# Patient Record
Sex: MALE | Race: WHITE | NOT HISPANIC OR LATINO | Employment: FULL TIME | URBAN - METROPOLITAN AREA
[De-identification: names, ages, dates, MRNs, and addresses within clinical notes are randomized per-mention and may not be internally consistent; named-entity substitution may affect disease eponyms.]

---

## 2018-05-13 PROBLEM — I10 ESSENTIAL HYPERTENSION: Status: ACTIVE | Noted: 2018-05-13

## 2018-05-13 PROBLEM — E86.1 INTRAVASCULAR VOLUME DEPLETION: Status: ACTIVE | Noted: 2018-05-13

## 2018-05-13 PROBLEM — E11.9 DIABETES MELLITUS, TYPE 2: Status: ACTIVE | Noted: 2018-05-13

## 2018-05-13 PROBLEM — R79.89 ELEVATED TROPONIN: Status: ACTIVE | Noted: 2018-05-13

## 2018-05-13 PROBLEM — K92.2 ACUTE GI BLEEDING: Status: ACTIVE | Noted: 2018-05-13

## 2018-05-13 PROBLEM — R53.1 WEAKNESS: Status: ACTIVE | Noted: 2018-05-13

## 2018-05-16 PROBLEM — D50.9 IRON DEFICIENCY ANEMIA: Status: ACTIVE | Noted: 2018-05-16

## 2018-06-06 ENCOUNTER — OFFICE VISIT (OUTPATIENT)
Dept: GASTROENTEROLOGY | Facility: CLINIC | Age: 53
End: 2018-06-06
Payer: COMMERCIAL

## 2018-06-06 ENCOUNTER — LAB VISIT (OUTPATIENT)
Dept: LAB | Facility: HOSPITAL | Age: 53
End: 2018-06-06
Attending: RADIOLOGY
Payer: COMMERCIAL

## 2018-06-06 VITALS
BODY MASS INDEX: 34.98 KG/M2 | SYSTOLIC BLOOD PRESSURE: 120 MMHG | HEIGHT: 69 IN | WEIGHT: 236.19 LBS | DIASTOLIC BLOOD PRESSURE: 70 MMHG | HEART RATE: 84 BPM

## 2018-06-06 DIAGNOSIS — Z87.898 HISTORY OF HEADACHE: ICD-10-CM

## 2018-06-06 DIAGNOSIS — K21.00 GASTROESOPHAGEAL REFLUX DISEASE WITH ESOPHAGITIS: ICD-10-CM

## 2018-06-06 DIAGNOSIS — I21.4 NSTEMI (NON-ST ELEVATED MYOCARDIAL INFARCTION): ICD-10-CM

## 2018-06-06 DIAGNOSIS — Z86.2 HISTORY OF ANEMIA: ICD-10-CM

## 2018-06-06 DIAGNOSIS — Z09 HOSPITAL DISCHARGE FOLLOW-UP: Primary | ICD-10-CM

## 2018-06-06 DIAGNOSIS — K31.9 MUCOSAL ABNORMALITY OF DUODENUM: ICD-10-CM

## 2018-06-06 DIAGNOSIS — K92.2 ACUTE GI BLEEDING: ICD-10-CM

## 2018-06-06 DIAGNOSIS — Z87.19 HISTORY OF DIVERTICULOSIS: ICD-10-CM

## 2018-06-06 DIAGNOSIS — I10 ESSENTIAL HYPERTENSION: ICD-10-CM

## 2018-06-06 DIAGNOSIS — R42 DIZZINESS: ICD-10-CM

## 2018-06-06 DIAGNOSIS — K44.9 HIATAL HERNIA: ICD-10-CM

## 2018-06-06 DIAGNOSIS — R79.89 ELEVATED TROPONIN: ICD-10-CM

## 2018-06-06 LAB
ERYTHROCYTE [DISTWIDTH] IN BLOOD BY AUTOMATED COUNT: 20.7 %
HCT VFR BLD AUTO: 33 %
HGB BLD-MCNC: 9.5 G/DL
MCH RBC QN AUTO: 21.1 PG
MCHC RBC AUTO-ENTMCNC: 28.8 G/DL
MCV RBC AUTO: 73 FL
PLATELET # BLD AUTO: 429 K/UL
PMV BLD AUTO: 8.8 FL
RBC # BLD AUTO: 4.51 M/UL
WBC # BLD AUTO: 6.88 K/UL

## 2018-06-06 PROCEDURE — 99214 OFFICE O/P EST MOD 30 MIN: CPT | Mod: S$GLB,,, | Performed by: NURSE PRACTITIONER

## 2018-06-06 PROCEDURE — 3078F DIAST BP <80 MM HG: CPT | Mod: CPTII,S$GLB,, | Performed by: NURSE PRACTITIONER

## 2018-06-06 PROCEDURE — 36415 COLL VENOUS BLD VENIPUNCTURE: CPT | Mod: PO

## 2018-06-06 PROCEDURE — 3008F BODY MASS INDEX DOCD: CPT | Mod: CPTII,S$GLB,, | Performed by: NURSE PRACTITIONER

## 2018-06-06 PROCEDURE — 85027 COMPLETE CBC AUTOMATED: CPT

## 2018-06-06 PROCEDURE — 99999 PR PBB SHADOW E&M-NEW PATIENT-LVL IV: CPT | Mod: PBBFAC,,, | Performed by: NURSE PRACTITIONER

## 2018-06-06 PROCEDURE — 3074F SYST BP LT 130 MM HG: CPT | Mod: CPTII,S$GLB,, | Performed by: NURSE PRACTITIONER

## 2018-06-06 NOTE — PROGRESS NOTES
"Subjective:       Patient ID: Americo Cain is a 53 y.o. male Body mass index is 34.88 kg/m².    Chief Complaint: Follow-up (hosp f/u , still some dizziness)    This patient is new to me.  Established patient of Dr. Robison.    Patient is here with family member, whom assisted with history. Patient reports feeling a little better since the hospital.    Reviewed hospital discharge summary: "Admission Date: 5/13/2018  Hospital Length of Stay: 3 days  Discharge Date and Time:  05/16/2018 1:28 PM  Attending Physician: Damian Posada MD   Discharging Provider: Damian Posada MD  Primary Care Provider: Primary Doctor No  HPI:   Patient is a 53 year old male with past medical history of diabetes mellitus type 2, hypertension presents with complaints of dizziness. Patient states that a couple of days ago prior to admission he states that he bent down and became dizzy after getting up. Patient states that he does not have any abdominal pain. He states that he did have some black stools initially which then went back to normal. He states that he takes naprosyn twice a day. He states that in the past he did have some epigastric pain. He presented to [facility] where he was noted to have a low H/H. He was transferred to [facility] for GI evaluation. Hospital medicine was consulted for further evaluation.  Procedure(s) (LRB):  COLONOSCOPY (N/A) :  EGD:  Impression:   - Normal oropharynx.                        - Normal upper third of esophagus and middle third of esophagus.                        - LA Grade B reflux esophagitis.                        - Z-line regular, 37 cm from the incisors.                        - Small hiatal hernia.                        - Slight antritis. Biopsied.                        - Normal stomach otherwise.                        - Normal pylorus.                        - Normal duodenal bulb.                        - Duodenal mucosal changes seen, rule out celiac disease. Biopsied.                        - " "Normal third portion of the duodenum.  Colonoscopy:  Impression:   - Diverticulosis in the sigmoid colon.                        - Non-bleeding internal hemorrhoids.                        - The examination was otherwise normal.                        - The examined portion of the ileum was normal.                        - No specimens collected.  Hospital Course:   Patient was admitted to the hospital medicine service with complaints of abdominal pain. Patient was found to have a low hemoglobin and hematocrit. He was also noted to have elevated cardiac enzymes with EKG changes. GI and cardiology were consulted. Suspect EKG changes are related to anemia. Patient underwent EGD which did not show any evidence of bleeding. Patient also went for a colonoscopy but did not show any evidence of bleeding as well. Patient will be continued on oral iron therapy for treatment. Patient was eventually discharged home in stable condition. "      Anemia   Presents for initial (initial to me) visit. Symptoms include bruises/bleeds easily, light-headedness (when he bends forward at times), malaise/fatigue and pica (eats ice a lot). There has been no abdominal pain, anorexia, fever, leg swelling or weight loss. Signs of blood loss that are not present include hematemesis, hematochezia and melena. Past treatments include oral iron supplements (vitamin C, iron, 3 units of PRBC). There is no history of alcohol abuse, cancer, chronic liver disease, chronic renal disease, clotting disorder, heart failure, hypothyroidism, inflammatory bowel disease, recent illness, recent surgery or recent trauma. Procedure history includes colonoscopy, EGD and FOBT.     Review of Systems   Constitutional: Positive for chills, fatigue and malaise/fatigue. Negative for appetite change, fever, unexpected weight change and weight loss.        Denies NSAID use since hospital use   HENT: Negative for sore throat and trouble swallowing.    Respiratory: Negative " for cough, choking and shortness of breath.    Cardiovascular: Negative for chest pain.   Gastrointestinal: Negative for abdominal pain, anal bleeding, anorexia, blood in stool, constipation, diarrhea, hematemesis, hematochezia, melena, nausea, rectal pain and vomiting.        Bowel movements twice daily of formed to soft formed stool   Genitourinary: Negative for difficulty urinating, dysuria, flank pain and hematuria.   Neurological: Positive for light-headedness (when he bends forward at times) and headaches (occasional migraines- seeing another provider for this). Negative for weakness.   Hematological: Bruises/bleeds easily.       Past Medical History:   Diagnosis Date    Anemia     Diabetes mellitus     Diverticulosis     Hypertension      Past Surgical History:   Procedure Laterality Date    APPENDECTOMY      COLON SURGERY      COLONOSCOPY N/A 5/15/2018    Procedure: COLONOSCOPY;  Surgeon: Blake Robison Jr., MD;  Location: [facility];  Service: Endoscopy;  Laterality: N/A; repeat in 10 years for screening    COLOSTOMY      at 13 years old-Had severe infection to Appendectomy involving colon     JOINT REPLACEMENT      REVISION COLOSTOMY      UPPER GASTROINTESTINAL ENDOSCOPY  05/14/2018    Dr. Robison     Family History   Problem Relation Age of Onset    Hypertension Mother     Diabetes type II Father     Heart disease Father     Hypertension Father     Ulcers Father     Colon cancer Neg Hx     Colon polyps Neg Hx     Crohn's disease Neg Hx     Esophageal cancer Neg Hx     Stomach cancer Neg Hx     Ulcerative colitis Neg Hx      Wt Readings from Last 10 Encounters:   06/06/18 107.1 kg (236 lb 3.2 oz)   05/13/18 114 kg (251 lb 5.2 oz)     Lab Results   Component Value Date    WBC 3.88 (L) 05/16/2018    HGB 8.1 (L) 05/16/2018    HCT 26.4 (L) 05/16/2018    MCV 71 (L) 05/16/2018     05/16/2018     Lab Results   Component Value Date    IRON 21 (L) 05/13/2018    TIBC 422 05/13/2018     FERRITIN 4 (L) 05/13/2018     Lab Results   Component Value Date    OCCULTBLOOD Positive (A) 05/13/2018     CMP  Sodium   Date Value Ref Range Status   05/14/2018 142 136 - 145 mmol/L Final     Potassium   Date Value Ref Range Status   05/14/2018 4.1 3.5 - 5.1 mmol/L Final     Chloride   Date Value Ref Range Status   05/14/2018 107 95 - 110 mmol/L Final     CO2   Date Value Ref Range Status   05/14/2018 27 22 - 31 mmol/L Final     Glucose   Date Value Ref Range Status   05/14/2018 87 70 - 110 mg/dL Final     Comment:     The ADA recommends the following guidelines for fasting glucose:  Normal:       less than 100 mg/dL  Prediabetes:  100 mg/dL to 125 mg/dL  Diabetes:     126 mg/dL or higher       BUN, Bld   Date Value Ref Range Status   05/14/2018 19 9 - 21 mg/dL Final     Creatinine   Date Value Ref Range Status   05/14/2018 0.74 0.50 - 1.40 mg/dL Final     Calcium   Date Value Ref Range Status   05/14/2018 8.4 8.4 - 10.2 mg/dL Final     Total Protein   Date Value Ref Range Status   05/13/2018 6.1 6.0 - 8.4 g/dL Final     Albumin   Date Value Ref Range Status   05/13/2018 3.7 3.5 - 5.2 g/dL Final     Total Bilirubin   Date Value Ref Range Status   05/13/2018 0.3 0.2 - 1.3 mg/dL Final     Alkaline Phosphatase   Date Value Ref Range Status   05/13/2018 64 38 - 145 U/L Final     AST   Date Value Ref Range Status   05/13/2018 17 17 - 59 U/L Final     ALT   Date Value Ref Range Status   05/13/2018 28 10 - 44 U/L Final     Anion Gap   Date Value Ref Range Status   05/14/2018 8 8 - 16 mmol/L Final     eGFR if    Date Value Ref Range Status   05/14/2018 >60 >60 mL/min/1.73 m^2 Final     eGFR if non    Date Value Ref Range Status   05/14/2018 >60 >60 mL/min/1.73 m^2 Final     Comment:     Calculation used to obtain the estimated glomerular filtration  rate (eGFR) is the CKD-EPI equation.        Reviewed prior medical records including endoscopy history (see surgical history).    5/14/18 EGD  "was reviewed and procedure report states:   " Findings:       The oropharynx was normal.       The upper third of the esophagus and middle third of the esophagus        were normal.       LA Grade B (one or more mucosal breaks greater than 5 mm, not        extending between the tops of two mucosal folds) reflux esophagitis        with no bleeding was found in the lower third of the esophagus.       The Z-line was regular and was found 37 cm from the incisors.       A small hiatal hernia was present.       Patchy mild inflammation characterized by congestion (edema) and        erosions was found in the gastric antrum and in the prepyloric        region of the stomach. Biopsies were taken with a cold forceps for        Helicobacter pylori testing using CLOtest. Biopsies were taken with        a cold forceps for histology.       The stomach was otherwise normal.       The pylorus was normal.       The duodenal bulb was normal.       Flattening (villous blunting?) was found in the first and second        portion of the duodenum. Biopsies for histology were taken with a        cold forceps for evaluation of celiac disease.       The third portion of the duodenum was normal.  Impression:           - Normal oropharynx.                        - Normal upper third of esophagus and middle third                         of esophagus.                        - LA Grade B reflux esophagitis.                        - Z-line regular, 37 cm from the incisors.                        - Small hiatal hernia.                        - Slight antritis. Biopsied.                        - Normal stomach otherwise.                        - Normal pylorus.                        - Normal duodenal bulb.                        - Duodenal mucosal changes seen, rule out celiac                         disease. Biopsied.                        - Normal third portion of the duodenum.  Recommendation:       - Return patient to hospital patel for ongoing " "care.                        - Perform a colonoscopy tomorrow.                        - Clear liquid diet.                        - Await pathology and CLOtest results.                        - Use Protonix (pantoprazole) 40 mg PO daily. ".  Biopsy results:   "1. STOMACH, ANTRUM, BIOPSY:       --MILD CHRONIC GASTRITIS WITH MILD REACTIVE/REGENERATIVE   EPITHELIAL CHANGES.       --RARE FOCAL INTESTINAL METAPLASIA IDENTIFIED.       --IMMUNOHISTOCHEMICAL STAIN FOR HELICOBACTER PYLORI IS NEGATIVE;   CONTROLS ARE APPROPRIATELY           REACTIVE.  2. SMALL BOWEL, DUODENUM, BIOPSY:        --DUODENAL TISSUE WITH CHRONIC AND FOCAL ACUTE INFLAMMATION, FOCI   OF VILLOUS BLUNTING, AND FOCALLY           INCREASED INTRAEPITHELIAL LYMPHOCYTES (SEE COMMENT)."    5/15/18 Colonoscopy was reviewed and procedure report states:   " Findings:       The perianal and digital rectal examinations were normal. Pertinent        negatives include normal sphincter tone, no palpable rectal lesions        and normal prostate (size, shape, and consistency).       Non-bleeding internal hemorrhoids were found during retroflexion.        The hemorrhoids were small and Grade I (internal hemorrhoids that do        not prolapse).       No additional abnormalities were found on retroflexion.       The rectum and recto-sigmoid colon appeared normal.       Rare small-mouthed diverticula were found in the sigmoid colon.       The exam was otherwise without abnormality.       .       The terminal ileum appeared normal.  Impression:           - Diverticulosis in the sigmoid colon.                        - Non-bleeding internal hemorrhoids.                        - The examination was otherwise normal.                        - The examined portion of the ileum was normal.                        - No specimens collected.  Recommendation:       - Return patient to hospital patel for possible                         discharge same day.                        - 1800 " "cande Diabetic (ADA) diet.                        - Take iron and vitamin C (such as NIFEREX or                         FEOSOL; and Vitamin C 500 mg, 1 of each every day                         with meals).                        - Check hemogram with white blood cell count and                         platelets in 2 weeks.                        - Repeat colonoscopy in 10 years for screening                         purposes. ".    Objective:      Physical Exam   Constitutional: He is oriented to person, place, and time. He appears well-developed and well-nourished. No distress.   HENT:   Mouth/Throat: Oropharynx is clear and moist and mucous membranes are normal. No oral lesions. No oropharyngeal exudate.   Eyes: Conjunctivae are normal. Pupils are equal, round, and reactive to light. No scleral icterus.   Cardiovascular: Normal rate.    Pulmonary/Chest: Effort normal and breath sounds normal. No respiratory distress. He has no wheezes.   Abdominal: Soft. Normal appearance and bowel sounds are normal. He exhibits no distension, no abdominal bruit and no mass. There is no hepatosplenomegaly. There is no tenderness. There is no rigidity, no rebound, no guarding, no tenderness at McBurney's point and negative Colon's sign. No hernia.   Well-healed surgical scars noted.   Neurological: He is alert and oriented to person, place, and time.   Skin: Skin is warm and dry. No rash noted. He is not diaphoretic. No erythema. No pallor.   Non-jaundiced   Psychiatric: He has a normal mood and affect. His behavior is normal. Judgment and thought content normal.   Nursing note and vitals reviewed.      Assessment:       1. Hospital discharge follow-up    2. History of anemia    3. Mucosal abnormality of duodenum    4. Gastroesophageal reflux disease with esophagitis    5. Hiatal hernia    6. History of headache    7. Dizziness    8. History of diverticulosis        Plan:     updated and discussed case with Dr. Robison; Dr. Robison " agreed with below management plan (possibly celiac & recommend below blood work to further evaluate)    Hospital discharge follow-up & History of anemia  -     Ambulatory Referral to Hematology / Oncology  -     Vitamin B12; Future; Expected date: 06/06/2018  -follow-up with PCP and/or hematology for continued evaluation and management    Mucosal abnormality of duodenum  -     Tissue transglutaminase, IgA; Future; Expected date: 06/06/2018  -     IgA; Future; Expected date: 06/06/2018    Gastroesophageal reflux disease with esophagitis  - CONTINUE PROTONIX 40 MG ONCE DAILY AS DIRECTED, take in the morning 30-60 minutes before breakfast, discussed about possible long term use of medication (prefer to use lowest effective dose or discontinuing if possible), pt verbalized understanding  -discussed about the different types of medications used to treat reflux and how to use them, antacids can be used PRN for breakthrough heartburn symptoms by reducing stomach acid that is already produced, H2 blockers (zantac) work by limiting the amount acid production, & PPI's work to block acid production and are taken daily, patient verbalized understanding.  -Educated patient on lifestyle modifications to help control/reduce reflux/abdominal pain including: avoid large meals, avoid eating within 2-3 hours of bedtime (avoid late night eating & lying down soon after eating), elevate head of bed if nocturnal symptoms are present, smoking cessation (if current smoker), & weight loss (if overweight).   -Educated to avoid known foods which trigger reflux symptoms & to minimize/avoid high-fat foods, chocolate, caffeine, citrus, alcohol, & tomato products.  -Advised to avoid use of NSAID's, since they can cause GI upset, bleeding, and/or ulcers.     Hiatal hernia  -discussed diagnosis with patient & that it is usually managed by controlling reflux symptoms, surgery is an option, but usually performed if reflux is uncontrolled by medication  management and lifestyle/dietary modifications; if symptoms persist despite medication management and lifestyle/dietary modifications, we can refer to general surgery to consult about surgical options, patient verbalized understanding    History of headache & Dizziness  Recommend follow-up with Primary Care Provider for continued evaluation and management.    History of diverticulosis  - discussed the diagnosis of diverticulosis and diverticulitis, & to prevent diverticulitis, high fiber diet is recommended.  -Recommended high fiber diet after episode has completely resolved. Recommended daily exercise, adequate water intake (six 8-oz glasses of water daily), and high fiber diet. OTC fiber supplements are recommended if diet does not reach daily fiber goal (25 grams daily), such as Metamucil, Citrucel, or FiberCon (take as directed, separate from other oral medications by >2 hours).  - Advised to avoid/minimize popcorn, corn, seeds, and nuts.    Follow-up in about 1 month (around 7/6/2018), or if symptoms worsen or fail to improve.      If no improvement in symptoms or symptoms worsen, call/follow-up at clinic or go to ER.

## 2018-06-06 NOTE — PATIENT INSTRUCTIONS
Celiac Disease     With celiac disease, villi inside the small intestine become damaged and cannot absorb nutrients properly.     Celiac disease is caused by a sensitivity or allergy to gluten. This is a protein found in many grains such as wheat, barley, and rye. Celiac disease affects villi (tiny, fingerlike stalks) in the small bowel (intestine). Normally, the villi make it possible for the small bowel to absorb nutrients from the food you eat. But celiac disease damages the villi. As a result, you cant absorb the nutrients you need, even if you eat plenty of food. Celiac disease is an autoimmune disease. You can manage the disease by removing gluten from your diet. This relieves your symptoms. It also reverses the damage to your small bowel. Celiac disease is sometimes called celiac sprue.  Causes of celiac disease  Celiac disease may have a genetic component. This means it can be passed down in families. If your healthcare provider thinks that you have celiac disease, he or she may advise that other members of your family be checked for it as well.  Signs and symptoms of celiac disease  The symptoms of celiac disease can vary for each person. Some people have no symptoms at all. If symptoms do happen, they can include:  · Diarrhea, constipation, or both  · Light colored, foul-smelling or fatty stool  · Belly pain and cramping  · Belly swelling or bloating  · Weight loss  · Bone or joint pain  · Iron deficiency  · Headaches  · Tiredness and loss of energy  · Mood changes, irritability, and depression  · Infertility  · Unexplained elevated liver tests  · Canker sores  · Skin rash  · Tooth enamel problems  Diagnosing celiac disease  Your healthcare provider will ask about your symptoms and health history. Youll also have a physical exam. Tests are then done to confirm the problem. These can include:  · Blood tests. These help check for specific proteins in the blood that are present with celiac disease. They  also check for anemia and help rule out other problems. The tests are done by taking a blood sample.  · Upper endoscopy with biopsy. This is done to see inside the stomach and duodenum (first part of the small bowel). For the test, an endoscope is used. This is a thin, flexible tube with a tiny camera on the end. Its inserted through the mouth and down into the stomach and duodenum. Tools are passed through the endoscope to remove tiny tissue samples (biopsy). The tissue samples are taken to a lab and looked at under a microscope. This is to check the tiny villi for damage. This test must be done while you are still eating food with gluten. This is the only way to see whether the presence of gluten is damaging the villi.  · Genetic tests. These check for problems with specific genes linked to celiac disease. They are done by taking blood samples.  Treating celiac disease  To treat celiac disease, you must remove all sources of gluten from your diet. This will allow the villi to heal, so that nutrients can be absorbed normally. Its important to follow a strict, gluten-free diet daily, even if you dont have symptoms. If you dont do this, the small bowel can become permanently damaged, which can lead to serious health problems. These include bone disease, cancer of the small bowel, and various nervous system disorders.  Sources of gluten  Gluten is found in wheat, barley, and rye. The most common foods with gluten are those made with wheat flour. These include bread, pasta, cake, and cereal. Gluten is also often found in beer, gravies, salad dressings, and most packaged foods. It is even found in some nonfood products, such as certain medicines and cosmetics. Your healthcare provider can refer you to a dietitian to  you about what you should avoid. The resources below will also give you lists of food and products that contain gluten.  Follow-up  Youll meet with your healthcare provider periodically to  monitor your health. During these visits, routine blood tests are often done to make sure your condition is under control. Your healthcare provider can also refer you to other healthcare providers or support and advocacy groups to help you cope with your condition.  Learning more about celiac disease  The following resources can help you learn more about celiac disease and how to manage it:  · Celiac Disease Foundation, www.celiac.org  · Celiac Sprue Association, www.csaceliacs.org  · Gluten Intolerance Group, www.gluten.net  · [employer] [place] of Diabetes and Digestive and Kidney Diseases, www2.niddk.nih.gov   Date Last Reviewed: 7/1/2016 © 2000-2017 [employer]. [address] [place] [address], [place] [address]. All rights reserved. This information is not intended as a substitute for professional medical care. Always follow your healthcare professional's instructions.        Anemia  Anemia is a condition that occurs when your body does not have enough healthy red blood cells (RBCs). RBCs are the parts of your blood that carry oxygen throughout your body. A protein called hemoglobin allows your RBCs to absorb and release oxygen. Without enough RBCs or hemoglobin, your body doesn't get enough oxygen. Symptoms of anemia may then occur.    What are the symptoms of anemia?  Some people with anemia have no symptoms. But most people have symptoms that range from mild to severe. These can include:  · Tiredness (fatigue)  · Weakness  · Pale skin  · Shortness of breath  · Dizziness or fainting  · Rapid heartbeat  · Trouble doing normal amounts of activity  · Jaundice (yellowing of your eyes, skin, or mouth; dark urine)  What causes anemia?  Anemia can occur when your body:  · Loses too much blood  · Does not make enough RBCs  · Destroys your RBCs at a faster rate than it can replace them  · Does not make a normal amount of hemoglobin in your RBCs  These problems can occur for many reasons, including:  · A  condition that you are born with (congenital or inherited), such as sickle cell disease or thalassemia  · Heavy bleeding for any reason, including injury, surgery, childbirth, or even heavy menstrual periods  · Being low in certain nutrients, such as iron, folate, or vitamin B12, possibly from a poor diet or a condition like celiac disease or Crohn's disease  · Certain chronic conditions like diabetes, arthritis, or kidney disease  · Certain chronic infections like tuberculosis or HIV  · Exposure to certain medicines, such as those used for chemotherapy  There are different types of anemia. Your healthcare provider can tell you more about the type of anemia you have and what may have caused it.  How is anemia diagnosed?  To diagnose anemia, your healthcare provider orders blood tests. These can include:  · Complete blood cell count (CBC). This test measures the amounts of the different types of blood cells.  · Blood smear. This test checks the size and shape of your blood cells. To do the test, a drop of your blood is viewed under a microscope. A stain is used to make the blood cells easier to see.  · Iron studies. These tests measure the amount of iron in your blood. Your body needs iron to make hemoglobin in your RBCs.  · Vitamin B12 and folate studies. These tests check for some of the components that help give RBCs a normal size and shape.  · Reticulocyte count. This test measures the amount of new RBCs that your bone marrow makes.  · Hemoglobin electrophoresis. This test checks for problems with your hemoglobin in RBCs.  How is anemia treated?  Treatment for anemia is based on the type of anemia, its cause, and the severity of your symptoms. Treatments may include:  · Diet changes. This involves increasing the amount of certain nutrients in your diet, such as iron, vitamin B12, or folate. Your healthcare provider may also prescribe nutrient supplements.  · Medicines. Certain medicines treat the cause of your  anemia. Others help build new RBCs or relieve symptoms. If a medicine is the cause of your anemia, you may need to stop or change it.  · Blood transfusions. Replacing some of your blood can increase the number of healthy RBCs in your body.  · Surgery. In some cases, your doctor may do surgery to treat the underlying cause of anemia. If you need surgery, your healthcare provider will explain the procedure and outline the risks and benefits for you.  What are the long-term concerns?  If you have a certain type of anemia, you can expect a full recovery after treatment. If you have other types of anemia (especially a type you're born with), you will need to manage it for life. Your doctor can tell you more.  Date Last Reviewed: 12/1/2016 © 2000-2017 [employer]. [address] [place] [address], [place] [address]. All rights reserved. This information is not intended as a substitute for professional medical care. Always follow your healthcare professional's instructions.

## 2018-06-08 ENCOUNTER — TELEPHONE (OUTPATIENT)
Dept: GASTROENTEROLOGY | Facility: CLINIC | Age: 53
End: 2018-06-08

## 2018-06-08 RX ORDER — LANOLIN ALCOHOL/MO/W.PET/CERES
1000 CREAM (GRAM) TOPICAL DAILY
COMMUNITY
Start: 2018-06-08

## 2018-06-08 NOTE — TELEPHONE ENCOUNTER
Please call to inform & review the results with the patient- blood work showed negative celiac screening; elevated IgA level (immune system level) & low vitamin B12 level. Recommend starting OTC b12 supplement: Take 1 tablet (1,000 mcg total) by mouth once daily and Recommend follow-up with Primary Care Provider for continued evaluation and management of these findings.  Continue with previous recommendations. If no improvement in symptoms or symptoms worsen, call/follow-up at clinic or go to ER.  Please release results to patient's mychart once you have discussed results and recommendations with patient.  Thanks,  Nancy FARAH

## 2019-06-02 ENCOUNTER — OFFICE VISIT (OUTPATIENT)
Dept: URGENT CARE | Facility: CLINIC | Age: 54
End: 2019-06-02

## 2019-06-02 VITALS
DIASTOLIC BLOOD PRESSURE: 80 MMHG | BODY MASS INDEX: 34.96 KG/M2 | WEIGHT: 236 LBS | TEMPERATURE: 98 F | HEIGHT: 69 IN | RESPIRATION RATE: 18 BRPM | HEART RATE: 78 BPM | OXYGEN SATURATION: 98 % | SYSTOLIC BLOOD PRESSURE: 154 MMHG

## 2019-06-02 DIAGNOSIS — E11.9 TYPE 2 DIABETES MELLITUS WITHOUT COMPLICATION, WITHOUT LONG-TERM CURRENT USE OF INSULIN: ICD-10-CM

## 2019-06-02 DIAGNOSIS — I10 ESSENTIAL HYPERTENSION: ICD-10-CM

## 2019-06-02 DIAGNOSIS — M54.2 CERVICALGIA: Primary | ICD-10-CM

## 2019-06-02 PROCEDURE — 3008F BODY MASS INDEX DOCD: CPT | Mod: CPTII,S$GLB,, | Performed by: FAMILY MEDICINE

## 2019-06-02 PROCEDURE — 3079F DIAST BP 80-89 MM HG: CPT | Mod: CPTII,S$GLB,, | Performed by: FAMILY MEDICINE

## 2019-06-02 PROCEDURE — 3077F SYST BP >= 140 MM HG: CPT | Mod: CPTII,S$GLB,, | Performed by: FAMILY MEDICINE

## 2019-06-02 PROCEDURE — 3079F PR MOST RECENT DIASTOLIC BLOOD PRESSURE 80-89 MM HG: ICD-10-PCS | Mod: CPTII,S$GLB,, | Performed by: FAMILY MEDICINE

## 2019-06-02 PROCEDURE — 3077F PR MOST RECENT SYSTOLIC BLOOD PRESSURE >= 140 MM HG: ICD-10-PCS | Mod: CPTII,S$GLB,, | Performed by: FAMILY MEDICINE

## 2019-06-02 PROCEDURE — 3008F PR BODY MASS INDEX (BMI) DOCUMENTED: ICD-10-PCS | Mod: CPTII,S$GLB,, | Performed by: FAMILY MEDICINE

## 2019-06-02 PROCEDURE — 99203 PR OFFICE/OUTPT VISIT, NEW, LEVL III, 30-44 MIN: ICD-10-PCS | Mod: S$GLB,,, | Performed by: FAMILY MEDICINE

## 2019-06-02 PROCEDURE — 99203 OFFICE O/P NEW LOW 30 MIN: CPT | Mod: S$GLB,,, | Performed by: FAMILY MEDICINE

## 2019-06-02 RX ORDER — METHYLPREDNISOLONE 4 MG/1
TABLET ORAL
Qty: 1 PACKAGE | Refills: 0 | Status: SHIPPED | OUTPATIENT
Start: 2019-06-02 | End: 2019-06-23

## 2019-06-02 RX ORDER — METHOCARBAMOL 750 MG/1
750 TABLET, FILM COATED ORAL 3 TIMES DAILY PRN
Qty: 21 TABLET | Refills: 0 | Status: SHIPPED | OUTPATIENT
Start: 2019-06-02 | End: 2019-06-09

## 2019-06-02 NOTE — PROGRESS NOTES
"Subjective:       Patient ID: Americo Cain is a 54 y.o. male.    Vitals:  height is 5' 9" (1.753 m) and weight is 107 kg (236 lb). His oral temperature is 97.8 °F (36.6 °C). His blood pressure is 154/80 (abnormal) and his pulse is 78. His respiration is 18 and oxygen saturation is 98%.     Chief Complaint: Neck Pain and Shoulder Pain    Pt presents today with neck pain. Pt states he saw orthopedics, needs MRI, unable to schedule due to work schedule, pt states he gets steroid injections from ortho    Neck Pain    This is a chronic problem. The current episode started in the past 7 days. The problem occurs constantly. The problem has been unchanged. The pain is associated with nothing. The pain is present in the anterior neck. The quality of the pain is described as shooting. The pain is at a severity of 9/10. The pain is severe. The symptoms are aggravated by bending and position. The pain is same all the time. Pertinent negatives include no chest pain, fever or headaches. He has tried NSAIDs for the symptoms. The treatment provided no relief.   Shoulder Pain    Pertinent negatives include no fever or headaches.       Constitution: Negative for chills, fatigue and fever.   HENT: Negative for congestion and sore throat.    Neck: Positive for neck pain and neck stiffness. Negative for painful lymph nodes.   Cardiovascular: Negative for chest pain and leg swelling.   Eyes: Negative for double vision and blurred vision.   Respiratory: Negative for cough and shortness of breath.    Gastrointestinal: Negative for nausea, vomiting and diarrhea.   Genitourinary: Negative for dysuria, frequency and urgency.   Musculoskeletal: Positive for pain. Negative for joint pain, joint swelling, muscle cramps and muscle ache.   Skin: Negative for color change, pale and rash.   Allergic/Immunologic: Negative for seasonal allergies.   Neurological: Negative for dizziness, history of vertigo, light-headedness, passing out and " headaches.   Hematologic/Lymphatic: Negative for swollen lymph nodes, easy bruising/bleeding and history of blood clots. Does not bruise/bleed easily.   Psychiatric/Behavioral: Negative for nervous/anxious, sleep disturbance and depression. The patient is not nervous/anxious.        Objective:      Physical Exam   Constitutional: He appears well-developed and well-nourished.   HENT:   Head: Normocephalic and atraumatic.   Musculoskeletal:        Cervical back: He exhibits decreased range of motion and tenderness.   Psychiatric: He has a normal mood and affect. His behavior is normal.   Nursing note and vitals reviewed.      Assessment:       1. Cervicalgia    2. Type 2 diabetes mellitus without complication, without long-term current use of insulin    3. Essential hypertension        Plan:         Cervicalgia        -     Has h/o GI bleed-therefore not a good candidate for NSAIDs  -     methylPREDNISolone (MEDROL DOSEPACK) 4 mg tablet; use as directed  Dispense: 1 Package; Refill: 0  -     methocarbamol (ROBAXIN) 750 MG Tab; Take 1 tablet (750 mg total) by mouth 3 (three) times daily as needed.  Dispense: 21 tablet; Refill: 0    Type 2 diabetes mellitus without complication, without long-term current use of insulin          -     Reports good A1C control          -     FBG averages < 120    Essential hypertension (ACC Stage 2)           -     F/U with PCP for BP re-check

## 2020-11-02 ENCOUNTER — OCCUPATIONAL HEALTH (OUTPATIENT)
Dept: URGENT CARE | Facility: CLINIC | Age: 55
End: 2020-11-02

## 2020-11-02 PROCEDURE — 80305 DRUG TEST PRSMV DIR OPT OBS: CPT | Mod: S$GLB,,, | Performed by: EMERGENCY MEDICINE

## 2020-11-02 PROCEDURE — 99499 UNLISTED E&M SERVICE: CPT | Mod: S$GLB,,, | Performed by: EMERGENCY MEDICINE

## 2020-11-02 PROCEDURE — 80305 PR COLLECTION ONLY DRUG SCREEN: ICD-10-PCS | Mod: S$GLB,,, | Performed by: EMERGENCY MEDICINE

## 2020-11-02 PROCEDURE — 99499 PR PHYSICAL - DOT/CDL: ICD-10-PCS | Mod: S$GLB,,, | Performed by: EMERGENCY MEDICINE

## 2021-05-07 ENCOUNTER — CLINICAL SUPPORT (OUTPATIENT)
Dept: URGENT CARE | Facility: CLINIC | Age: 56
End: 2021-05-07

## 2021-05-08 PROBLEM — I51.7 ATRIAL ENLARGEMENT, LEFT: Status: ACTIVE | Noted: 2021-05-08

## 2021-05-10 ENCOUNTER — PATIENT MESSAGE (OUTPATIENT)
Dept: RESEARCH | Facility: HOSPITAL | Age: 56
End: 2021-05-10